# Patient Record
Sex: FEMALE | Race: WHITE | NOT HISPANIC OR LATINO | Employment: FULL TIME | URBAN - NONMETROPOLITAN AREA
[De-identification: names, ages, dates, MRNs, and addresses within clinical notes are randomized per-mention and may not be internally consistent; named-entity substitution may affect disease eponyms.]

---

## 2023-02-03 ENCOUNTER — OFFICE VISIT (OUTPATIENT)
Dept: INTERNAL MEDICINE | Facility: CLINIC | Age: 48
End: 2023-02-03
Payer: COMMERCIAL

## 2023-02-03 VITALS
HEIGHT: 66 IN | OXYGEN SATURATION: 98 % | HEART RATE: 88 BPM | SYSTOLIC BLOOD PRESSURE: 120 MMHG | DIASTOLIC BLOOD PRESSURE: 72 MMHG | WEIGHT: 196.8 LBS | TEMPERATURE: 97 F | BODY MASS INDEX: 31.63 KG/M2

## 2023-02-03 DIAGNOSIS — E28.2 PCOS (POLYCYSTIC OVARIAN SYNDROME): ICD-10-CM

## 2023-02-03 DIAGNOSIS — Z12.12 ENCOUNTER FOR COLORECTAL CANCER SCREENING: ICD-10-CM

## 2023-02-03 DIAGNOSIS — Z00.00 WELL ADULT EXAM: Primary | ICD-10-CM

## 2023-02-03 DIAGNOSIS — L20.9 ATOPIC DERMATITIS, UNSPECIFIED TYPE: ICD-10-CM

## 2023-02-03 DIAGNOSIS — Z12.11 ENCOUNTER FOR COLORECTAL CANCER SCREENING: ICD-10-CM

## 2023-02-03 PROCEDURE — 99386 PREV VISIT NEW AGE 40-64: CPT | Performed by: FAMILY MEDICINE

## 2023-02-03 RX ORDER — METFORMIN HYDROCHLORIDE 500 MG/1
1000 TABLET, EXTENDED RELEASE ORAL
Qty: 180 TABLET | Refills: 3 | Status: SHIPPED | OUTPATIENT
Start: 2023-02-03

## 2023-02-03 RX ORDER — SPIRONOLACTONE 100 MG/1
100 TABLET, FILM COATED ORAL 2 TIMES DAILY
COMMUNITY
End: 2023-02-03 | Stop reason: SDUPTHER

## 2023-02-03 RX ORDER — SPIRONOLACTONE 100 MG/1
100 TABLET, FILM COATED ORAL 2 TIMES DAILY
Qty: 180 TABLET | Refills: 3 | Status: SHIPPED | OUTPATIENT
Start: 2023-02-03

## 2023-02-03 NOTE — PROGRESS NOTES
Charlotte Romero is a 47 y.o. female.    Chief Complaint   Patient presents with   • Annual Exam   • Establish Care       HPI   Patient presents today to establish care and for annual physical exam.    Patient denies any usage of tobacco, alcohol, and illicit drugs. She had a pap smear with HPV testing on 01/232020. Her last flu vaccine was in 11/2022 and she has receive 2 COVID vaccines plus a booster. She is up to date on her tetanus vaccine. Patient reports she has not had any colorectal cancer screening and denies and family history of colon cancer. Patient states she is eating a healthy diet and exercises a couple of times a week, depending on the weather. She mentions she no longer does strenuous exercise. She is up to date on her eye and dental exams. Patient had labs obtained less than year ago. She denies any concern for anxiety or depression.    She is currently taking 2 tablets of Spironolactone 100 mg daily for her polycystic ovary syndrome. She states she was previously on metformin 1500 mg for this but stopped taking it due to the possible side effects.    She reports she has experienced eczema on her ears that began around the time of COVID-19. She attributes this to wearing the facial masks. She states she has tried using 2.5% hydrocortisone cream alone but it did not help. Patient reports applying Eucerin did help her symptoms, and she adds the hydrocortisone cream to help the itching.    Patient reports her biggest issue is her weight. She states while she was taking metformin with spironolactone and eating a low carb diet she dropped the weight. Since turning 40, she reports she has gained weight even while eating a healthy diet on weight watchers.    The following portions of the patient's history were reviewed and updated as appropriate: allergies, current medications, past family history, past medical history, past social history, past surgical history and problem list.     Past Medical  History:   Diagnosis Date   • PCOS (polycystic ovarian syndrome) 2009    From pt       Past Surgical History:   Procedure Laterality Date   • CARPAL TUNNEL RELEASE Bilateral    • UTERINE FIBROID SURGERY N/A 2018    patient proved info   • WISDOM TOOTH EXTRACTION         Family History   Problem Relation Age of Onset   • Heart disease Mother    • Diabetes Mother    • Heart attack Mother    • Coronary artery disease Mother    • Hyperlipidemia Mother    • Hypertension Mother    • Heart disease Father    • Diabetes Father    • Atrial fibrillation Father    • Fibroids Sister    • Fibroids Sister    • Stroke Paternal Grandmother    • Heart attack Paternal Grandfather        Social History     Socioeconomic History   • Marital status:    Tobacco Use   • Smoking status: Never     Passive exposure: Never   • Smokeless tobacco: Never   Vaping Use   • Vaping Use: Never used   Substance and Sexual Activity   • Alcohol use: Never   • Drug use: Never   • Sexual activity: Yes     Partners: Male       No Known Allergies      Current Outpatient Medications:   •  spironolactone (ALDACTONE) 100 MG tablet, Take 1 tablet by mouth 2 (Two) Times a Day., Disp: 180 tablet, Rfl: 3  •  Crisaborole 2 % ointment, Apply topically to the affected area 2 (Two) Times a Day., Disp: 100 g, Rfl: 1    ROS    Review of Systems   Constitutional: Negative for chills, fatigue and fever.   HENT: Negative for congestion, postnasal drip and sore throat.    Eyes: Negative for blurred vision and visual disturbance.   Respiratory: Negative for cough, shortness of breath and wheezing.    Cardiovascular: Negative for chest pain and leg swelling.   Gastrointestinal: Negative for abdominal pain, constipation, diarrhea, nausea and vomiting.   Endocrine: Positive for cold intolerance. Negative for heat intolerance.   Genitourinary: Negative for dysuria and frequency.   Musculoskeletal: Negative for arthralgias and back pain.   Skin: Positive for rash. Negative  "for color change.        Eczema on ears   Neurological: Negative for weakness, numbness and headache.   Hematological: Does not bruise/bleed easily.   Psychiatric/Behavioral: Negative for depressed mood. The patient is not nervous/anxious.        Vitals:    02/03/23 0944   BP: 120/72   Pulse: 88   Temp: 97 °F (36.1 °C)   SpO2: 98%   Weight: 89.3 kg (196 lb 12.8 oz)   Height: 167.6 cm (66\")   PainSc: 0-No pain     Body mass index is 31.76 kg/m².    Physical Exam     Physical Exam  Constitutional:       General: She is not in acute distress.     Appearance: Normal appearance. She is well-developed.   HENT:      Head: Normocephalic and atraumatic.      Right Ear: Tympanic membrane and external ear normal.      Left Ear: Tympanic membrane and external ear normal.      Ears:      Comments: Blue tinge behind cerumen in right ear canal (no longer present once ear was irrigated)  Eyes:      Conjunctiva/sclera: Conjunctivae normal.      Pupils: Pupils are equal, round, and reactive to light.   Cardiovascular:      Rate and Rhythm: Normal rate and regular rhythm.      Heart sounds: No murmur heard.  Pulmonary:      Effort: Pulmonary effort is normal. No respiratory distress.      Breath sounds: Normal breath sounds. No wheezing.   Abdominal:      General: Bowel sounds are normal. There is no distension.      Palpations: Abdomen is soft.      Tenderness: There is no abdominal tenderness.   Musculoskeletal:         General: Normal range of motion.      Cervical back: Normal range of motion and neck supple.   Lymphadenopathy:      Cervical: No cervical adenopathy.   Skin:     General: Skin is warm and dry.   Neurological:      Mental Status: She is alert and oriented to person, place, and time.      Cranial Nerves: No cranial nerve deficit.      Deep Tendon Reflexes: Reflexes normal.   Psychiatric:         Mood and Affect: Mood normal.         Behavior: Behavior normal.         Assessment/Plan    Diagnoses and all orders for " this visit:    1. Well adult exam (Primary)  Assessment & Plan:  Unremarkable physical exam findings. Discussed with patient routine health maintenance including vaccines, dental/eye health, healthy diet and exercise, mammograms, Pap smears, colorectal cancer screening.  Mental health has been addressed today as well. Reviewed labs that were ordered by another facility several months ago but within the last year. Cologuard has been ordered today.      2. PCOS (polycystic ovarian syndrome)  Assessment & Plan:  Patient will continue spironolactone. We'll start back on metformin due to weight gain since being off the medication. Also encouraged regular exercise and dietary changes.      3. Encounter for colorectal cancer screening  -     Cologuard - Stool, Per Rectum; Future    4. Atopic dermatitis, unspecified type  Assessment & Plan:  Uncontrolled with steroids and eucerin cream.  Will send in prescription for eucrisa.  Encouraged to obtain savings card from their website.       Other orders  -     Crisaborole 2 % ointment; Apply topically to the affected area 2 (Two) Times a Day.  Dispense: 100 g; Refill: 1  -     spironolactone (ALDACTONE) 100 MG tablet; Take 1 tablet by mouth 2 (Two) Times a Day.  Dispense: 180 tablet; Refill: 3        New Medications Ordered This Visit   Medications   • Crisaborole 2 % ointment     Sig: Apply topically to the affected area 2 (Two) Times a Day.     Dispense:  100 g     Refill:  1   • spironolactone (ALDACTONE) 100 MG tablet     Sig: Take 1 tablet by mouth 2 (Two) Times a Day.     Dispense:  180 tablet     Refill:  3         No orders of the defined types were placed in this encounter.      Return in about 1 year (around 2/3/2024) for Annual.      Transcribed from ambient dictation for Aviva Stout DO by Garima Desai.  02/03/23   13:53 EST    Patient or patient representative verbalized consent to the visit recording.  I have personally performed the services described in  this document as transcribed by the above individual, and it is both accurate and complete.  Aviva Stout DO  2/3/2023  18:52 EST

## 2023-02-03 NOTE — ASSESSMENT & PLAN NOTE
Unremarkable physical exam findings. Discussed with patient routine health maintenance including vaccines, dental/eye health, healthy diet and exercise, mammograms, Pap smears, colorectal cancer screening.  Mental health has been addressed today as well. Reviewed labs that were ordered by another facility several months ago but within the last year. Cologuard has been ordered today.

## 2023-02-03 NOTE — ASSESSMENT & PLAN NOTE
Patient will continue spironolactone. We'll start back on metformin due to weight gain since being off the medication. Also encouraged regular exercise and dietary changes.

## 2023-02-03 NOTE — ASSESSMENT & PLAN NOTE
Uncontrolled with steroids and eucerin cream.  Will send in prescription for eucrisa.  Encouraged to obtain savings card from their website.

## 2023-04-27 RX ORDER — SPIRONOLACTONE 100 MG/1
100 TABLET, FILM COATED ORAL 2 TIMES DAILY
Qty: 60 TABLET | Refills: 0 | Status: SHIPPED | OUTPATIENT
Start: 2023-04-27

## 2023-06-19 ENCOUNTER — OFFICE VISIT (OUTPATIENT)
Dept: INTERNAL MEDICINE | Facility: CLINIC | Age: 48
End: 2023-06-19
Payer: COMMERCIAL

## 2023-06-19 VITALS
BODY MASS INDEX: 32.47 KG/M2 | TEMPERATURE: 98 F | DIASTOLIC BLOOD PRESSURE: 80 MMHG | HEIGHT: 66 IN | SYSTOLIC BLOOD PRESSURE: 120 MMHG | WEIGHT: 202 LBS | OXYGEN SATURATION: 97 % | HEART RATE: 74 BPM

## 2023-06-19 DIAGNOSIS — E66.9 CLASS 1 OBESITY WITHOUT SERIOUS COMORBIDITY WITH BODY MASS INDEX (BMI) OF 32.0 TO 32.9 IN ADULT, UNSPECIFIED OBESITY TYPE: Primary | ICD-10-CM

## 2023-06-19 PROBLEM — E66.811 CLASS 1 OBESITY WITHOUT SERIOUS COMORBIDITY WITH BODY MASS INDEX (BMI) OF 32.0 TO 32.9 IN ADULT: Status: ACTIVE | Noted: 2023-06-19

## 2023-06-19 PROCEDURE — 99213 OFFICE O/P EST LOW 20 MIN: CPT | Performed by: FAMILY MEDICINE

## 2023-06-19 RX ORDER — SEMAGLUTIDE 0.25 MG/.5ML
0.25 INJECTION, SOLUTION SUBCUTANEOUS WEEKLY
Qty: 2 ML | Refills: 1 | Status: SHIPPED | OUTPATIENT
Start: 2023-06-19

## 2023-06-19 NOTE — ASSESSMENT & PLAN NOTE
Encouraged to continue a healthy diet and routine exercise. She has not lost any weight on metformin here recently. She has not tried any other oral medications at this time, but she is interested in trying Wegovy. Advised that this medication will need a prior authorization. If denied, we will go from there. However if covered, patient is advised to take a savings card to the pharmacy for better pricing. Discussed how medication should be taken and when to increase the dosage.

## 2023-06-19 NOTE — PROGRESS NOTES
Charlotte Romero is a 47 y.o. female.    Chief Complaint   Patient presents with    Weight Loss     Has been on Noom since march with no success.        HPI     Charlotte Romero is a 47-year-old female who presents today for weight loss.    The patient reports that although she is counting calories, she does not have any significant changes in her weight. She often fluctuates about 1 to 4 pounds and stays around 198 pounds. She states that she has had more improvement with taking metformin in the past. She denies previously taking Wellbutrin, Topamax, Contrave, Adipex, or any other oral medications for weight loss. She notes concern with taking antidepressants.    The patient has a history of PCOS, which was the primary reason for taking metformin. She states that her menstrual cycle regularly occurs every 3 weeks. She denies having any heavy menses.     The following portions of the patient's history were reviewed and updated as appropriate: allergies, current medications, past family history, past medical history, past social history, past surgical history and problem list.     No Known Allergies      Current Outpatient Medications:     Crisaborole 2 % ointment, Apply topically to the affected area 2 (Two) Times a Day., Disp: 100 g, Rfl: 1    metFORMIN ER (GLUCOPHAGE-XR) 500 MG 24 hr tablet, Take 2 tablets by mouth Daily With Breakfast., Disp: 180 tablet, Rfl: 3    spironolactone (ALDACTONE) 100 MG tablet, Take 1 tablet by mouth 2 (Two) Times a Day., Disp: 60 tablet, Rfl: 0    Semaglutide-Weight Management (Wegovy) 0.25 MG/0.5ML solution auto-injector, Inject 0.25 mg under the skin into the appropriate area as directed 1 (One) Time Per Week., Disp: 2 mL, Rfl: 1    ROS    Review of Systems   Respiratory:  Negative for cough, shortness of breath and wheezing.    Cardiovascular:  Negative for chest pain and leg swelling.   Gastrointestinal:  Negative for abdominal pain, constipation, diarrhea, nausea and vomiting.  "    Vitals:    06/19/23 1625   BP: 120/80   BP Location: Left arm   Patient Position: Sitting   Cuff Size: Adult   Pulse: 74   Temp: 98 °F (36.7 °C)   SpO2: 97%   Weight: 91.6 kg (202 lb)   Height: 167.6 cm (66\")   PainSc: 0-No pain     Body mass index is 32.6 kg/m².    Physical Exam     Physical Exam  Constitutional:       General: She is not in acute distress.     Appearance: She is well-developed. She is obese.   HENT:      Head: Normocephalic and atraumatic.      Right Ear: External ear normal.      Left Ear: External ear normal.   Eyes:      Extraocular Movements: Extraocular movements intact.      Conjunctiva/sclera: Conjunctivae normal.   Cardiovascular:      Rate and Rhythm: Normal rate and regular rhythm.      Heart sounds: No murmur heard.  Pulmonary:      Effort: Pulmonary effort is normal. No respiratory distress.      Breath sounds: Normal breath sounds. No wheezing.   Abdominal:      General: Bowel sounds are normal. There is no distension.      Palpations: Abdomen is soft.      Tenderness: There is no abdominal tenderness.   Musculoskeletal:      Right lower leg: No edema.      Left lower leg: No edema.   Skin:     General: Skin is warm and dry.   Neurological:      Mental Status: She is alert and oriented to person, place, and time.      Cranial Nerves: No cranial nerve deficit.   Psychiatric:         Mood and Affect: Mood normal.         Behavior: Behavior normal.       Assessment/Plan    Diagnoses and all orders for this visit:    1. Class 1 obesity without serious comorbidity with body mass index (BMI) of 32.0 to 32.9 in adult, unspecified obesity type (Primary)  Assessment & Plan:  Encouraged to continue a healthy diet and routine exercise. She has not lost any weight on metformin here recently. She has not tried any other oral medications at this time, but she is interested in trying Wegovy. Advised that this medication will need a prior authorization. If denied, we will go from there. However " if covered, patient is advised to take a savings card to the pharmacy for better pricing. Discussed how medication should be taken and when to increase the dosage.      Other orders  -     Semaglutide-Weight Management (Wegovy) 0.25 MG/0.5ML solution auto-injector; Inject 0.25 mg under the skin into the appropriate area as directed 1 (One) Time Per Week.  Dispense: 2 mL; Refill: 1        New Medications Ordered This Visit   Medications    Semaglutide-Weight Management (Wegovy) 0.25 MG/0.5ML solution auto-injector     Sig: Inject 0.25 mg under the skin into the appropriate area as directed 1 (One) Time Per Week.     Dispense:  2 mL     Refill:  1       No orders of the defined types were placed in this encounter.      Return in about 3 months (around 9/19/2023) for weight check.    Aviva Stout DO        Transcribed from ambient dictation for Aviva Stout DO by Brynn Joiner.  06/19/23   17:33 EDT    Patient or patient representative verbalized consent to the visit recording.  I have personally performed the services described in this document as transcribed by the above individual, and it is both accurate and complete.  Aviva Stout DO  6/19/2023  20:43 EDT

## 2023-10-06 ENCOUNTER — PATIENT MESSAGE (OUTPATIENT)
Dept: INTERNAL MEDICINE | Facility: CLINIC | Age: 48
End: 2023-10-06
Payer: COMMERCIAL

## 2023-10-06 NOTE — TELEPHONE ENCOUNTER
From: Charlotte Romero  To: Aviva Stout  Sent: 10/6/2023 8:18 AM EDT  Subject: Wegovy preapproval    Hello, I received a letter from my insurance that stated the preapproval is set to  and my provider needs to update it. I have not made a follow up apt because I have only had one month of Wegovy since it was prescribed. It has been difficult to get. I have had success during that month and would like to continue to use. From my understanding I would need to be seen after 3 months of use to assess the need to increase the dose. I have not gotten that far with it. Can I get the preapproval renewed so that I can continue with the medication?  Thanks,  Charlotte

## 2023-10-17 RX ORDER — BUPROPION HYDROCHLORIDE 150 MG/1
150 TABLET ORAL DAILY
Qty: 90 TABLET | Refills: 1 | Status: SHIPPED | OUTPATIENT
Start: 2023-10-17

## 2024-01-02 RX ORDER — BUPROPION HYDROCHLORIDE 150 MG/1
150 TABLET ORAL DAILY
Qty: 90 TABLET | Refills: 1 | Status: SHIPPED | OUTPATIENT
Start: 2024-01-02

## 2024-01-02 RX ORDER — BUPROPION HYDROCHLORIDE 150 MG/1
150 TABLET ORAL DAILY
Qty: 90 TABLET | Refills: 1 | OUTPATIENT
Start: 2024-01-02

## 2024-03-05 RX ORDER — METFORMIN HYDROCHLORIDE 500 MG/1
1000 TABLET, EXTENDED RELEASE ORAL
Qty: 180 TABLET | Refills: 3 | Status: SHIPPED | OUTPATIENT
Start: 2024-03-05

## 2024-04-15 ENCOUNTER — OFFICE VISIT (OUTPATIENT)
Dept: INTERNAL MEDICINE | Facility: CLINIC | Age: 49
End: 2024-04-15
Payer: COMMERCIAL

## 2024-04-15 VITALS
TEMPERATURE: 98 F | DIASTOLIC BLOOD PRESSURE: 78 MMHG | HEIGHT: 66 IN | HEART RATE: 84 BPM | OXYGEN SATURATION: 100 % | WEIGHT: 200 LBS | BODY MASS INDEX: 32.14 KG/M2 | SYSTOLIC BLOOD PRESSURE: 118 MMHG

## 2024-04-15 DIAGNOSIS — Z00.00 WELL ADULT EXAM: Primary | ICD-10-CM

## 2024-04-15 DIAGNOSIS — Z13.29 SCREENING FOR ENDOCRINE, METABOLIC AND IMMUNITY DISORDER: ICD-10-CM

## 2024-04-15 DIAGNOSIS — Z13.228 SCREENING FOR ENDOCRINE, METABOLIC AND IMMUNITY DISORDER: ICD-10-CM

## 2024-04-15 DIAGNOSIS — Z13.0 SCREENING FOR ENDOCRINE, METABOLIC AND IMMUNITY DISORDER: ICD-10-CM

## 2024-04-15 DIAGNOSIS — L20.9 ATOPIC DERMATITIS, UNSPECIFIED TYPE: ICD-10-CM

## 2024-04-15 DIAGNOSIS — E28.2 PCOS (POLYCYSTIC OVARIAN SYNDROME): ICD-10-CM

## 2024-04-15 DIAGNOSIS — E66.9 CLASS 1 OBESITY WITHOUT SERIOUS COMORBIDITY WITH BODY MASS INDEX (BMI) OF 32.0 TO 32.9 IN ADULT, UNSPECIFIED OBESITY TYPE: ICD-10-CM

## 2024-04-15 DIAGNOSIS — Z13.220 SCREENING, LIPID: ICD-10-CM

## 2024-04-15 DIAGNOSIS — F41.9 ANXIETY: ICD-10-CM

## 2024-04-15 PROCEDURE — 99396 PREV VISIT EST AGE 40-64: CPT | Performed by: FAMILY MEDICINE

## 2024-04-15 RX ORDER — SPIRONOLACTONE 100 MG/1
100 TABLET, FILM COATED ORAL 2 TIMES DAILY
Qty: 180 TABLET | Refills: 3 | Status: SHIPPED | OUTPATIENT
Start: 2024-04-15

## 2024-04-15 RX ORDER — PROPRANOLOL HYDROCHLORIDE 10 MG/1
10 TABLET ORAL 3 TIMES DAILY PRN
Qty: 90 TABLET | Refills: 1 | Status: SHIPPED | OUTPATIENT
Start: 2024-04-15

## 2024-04-15 RX ORDER — BUPROPION HYDROCHLORIDE 150 MG/1
150 TABLET ORAL DAILY
Qty: 90 TABLET | Refills: 3 | Status: SHIPPED | OUTPATIENT
Start: 2024-04-15

## 2024-04-15 RX ORDER — METFORMIN HYDROCHLORIDE 500 MG/1
1000 TABLET, EXTENDED RELEASE ORAL
Qty: 180 TABLET | Refills: 3 | Status: SHIPPED | OUTPATIENT
Start: 2024-04-15

## 2024-04-15 NOTE — ASSESSMENT & PLAN NOTE
The patient will maintain her current regimen of metformin and Wellbutrin. She acknowledges the necessity for lifestyle modifications to aid in weight loss.

## 2024-04-15 NOTE — ASSESSMENT & PLAN NOTE
A comprehensive discussion was held with the patient regarding routine health maintenance, inclusive of vaccines, dental/eye health, a healthy diet, regular exercise, colorectal cancer screening, breast cancer screening, and Pap smears. The patient's mental health has been addressed today. Routine laboratory tests have been ordered.

## 2024-04-15 NOTE — ASSESSMENT & PLAN NOTE
Her anxiety has shown improvement with Wellbutrin. She will continue with her current medication regimen. She may take propranolol as needed for immediate relief of test anxiety.

## 2024-04-15 NOTE — PROGRESS NOTES
Charlotte Romero is a 48 y.o. female.    Chief Complaint   Patient presents with    Annual Exam       HPI     Charlotte Romero is a 48-year-old female who presents today for an annual physical exam.    Patient acknowledges a lack of adherence to a healthy diet.  Wellbutrin has not helped with weight loss, but has helped with anxiety with recent life stressor. Her weight has remained consistent. Her busy schedule has hindered her ability to exercise regularly. Her last Pap smear was conducted in 2020, and she underwent a Cologuard test in 2023. Her last mammogram was conducted in 2021. She is current with her dental and eye exams. Her menstrual cycles are irregular, and she gained a few pounds prior to her menstrual cycle.     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     Patient has eczema in her ears and notes the medicine works some. She inquired if it is okay to take Claritin every day if there are any long-term effects. This happened when COVID-19 started and she got her dog. The Claritin helped some, but it does not go away.    Patient continues her regimen of Wellbutrin, metformin, and spironolactone, taking two tablets daily, and is seeking refills for these medications. She is considering starting propranolol for test anxiety, but is concerned about potential interactions with spironolactone.     Patient confirms that her immunizations for influenza and COVID-19 are up to date, with her last tetanus vaccination administered in 2021 at her workplace.     The following portions of the patient's history were reviewed and updated as appropriate: allergies, current medications, past family history, past medical history, past social history, past surgical history and problem list.     Past Medical History:   Diagnosis Date    PCOS (polycystic ovarian syndrome) 2009    From pt       Past Surgical History:   Procedure Laterality Date     CARPAL TUNNEL RELEASE Bilateral     UTERINE FIBROID SURGERY N/A 2018    patient proved info    WISDOM TOOTH EXTRACTION         Family History   Problem Relation Age of Onset    Heart disease Mother     Diabetes Mother     Heart attack Mother     Coronary artery disease Mother     Hyperlipidemia Mother     Hypertension Mother     Heart disease Father     Diabetes Father     Atrial fibrillation Father     Fibroids Sister     Fibroids Sister     Stroke Paternal Grandmother     Heart attack Paternal Grandfather        Social History     Socioeconomic History    Marital status:    Tobacco Use    Smoking status: Never     Passive exposure: Never    Smokeless tobacco: Never   Vaping Use    Vaping status: Never Used   Substance and Sexual Activity    Alcohol use: Never    Drug use: Never    Sexual activity: Yes     Partners: Male       No Known Allergies      Current Outpatient Medications:     buPROPion XL (WELLBUTRIN XL) 150 MG 24 hr tablet, Take 1 tablet by mouth Daily., Disp: 90 tablet, Rfl: 3    Crisaborole 2 % ointment, Apply topically to the affected area 2 (Two) Times a Day., Disp: 100 g, Rfl: 1    metFORMIN ER (GLUCOPHAGE-XR) 500 MG 24 hr tablet, Take 2 tablets by mouth Daily With Breakfast., Disp: 180 tablet, Rfl: 3    spironolactone (ALDACTONE) 100 MG tablet, Take 1 tablet by mouth 2 (Two) Times a Day., Disp: 180 tablet, Rfl: 3    propranolol (INDERAL) 10 MG tablet, Take 1 tablet by mouth 3 (Three) Times a Day As Needed (situtational anxiety)., Disp: 90 tablet, Rfl: 1    ROS    Review of Systems   Constitutional:  Negative for chills, fatigue and fever.   HENT:  Negative for congestion and rhinorrhea.    Eyes:  Negative for blurred vision, itching and visual disturbance.   Respiratory:  Negative for cough and shortness of breath.    Cardiovascular:  Negative for chest pain and leg swelling.   Gastrointestinal:  Negative for abdominal pain, constipation, diarrhea, nausea and vomiting.   Musculoskeletal:   "Negative for arthralgias and back pain.   Skin:  Negative for color change and rash.   Allergic/Immunologic: Positive for environmental allergies.   Neurological:  Negative for weakness, numbness and headache.   Hematological:  Does not bruise/bleed easily.   Psychiatric/Behavioral:  Negative for depressed mood. The patient is not nervous/anxious.        Vitals:    04/15/24 0818   BP: 118/78   BP Location: Right arm   Patient Position: Sitting   Cuff Size: Adult   Pulse: 84   Temp: 98 °F (36.7 °C)   SpO2: 100%   Weight: 90.7 kg (200 lb)   Height: 167.6 cm (66\")   PainSc: 0-No pain     Body mass index is 32.28 kg/m².    Physical Exam     Physical Exam  Constitutional:       General: She is not in acute distress.     Appearance: Normal appearance. She is well-developed.   HENT:      Head: Normocephalic and atraumatic.      Right Ear: Tympanic membrane and external ear normal.      Left Ear: Tympanic membrane and external ear normal.      Mouth/Throat:      Pharynx: No posterior oropharyngeal erythema.   Eyes:      Extraocular Movements: Extraocular movements intact.      Conjunctiva/sclera: Conjunctivae normal.      Pupils: Pupils are equal, round, and reactive to light.   Cardiovascular:      Rate and Rhythm: Normal rate and regular rhythm.      Heart sounds: No murmur heard.  Pulmonary:      Effort: Pulmonary effort is normal. No respiratory distress.      Breath sounds: Normal breath sounds. No wheezing.   Abdominal:      General: Bowel sounds are normal. There is no distension.      Palpations: Abdomen is soft.      Tenderness: There is no abdominal tenderness.   Musculoskeletal:      Cervical back: Neck supple.      Right lower leg: No edema.      Left lower leg: No edema.   Lymphadenopathy:      Cervical: No cervical adenopathy.   Skin:     General: Skin is warm and dry.   Neurological:      Mental Status: She is alert and oriented to person, place, and time.      Cranial Nerves: No cranial nerve deficit.      " Deep Tendon Reflexes: Reflexes normal.   Psychiatric:         Mood and Affect: Mood normal.         Behavior: Behavior normal.         Assessment/Plan    Diagnoses and all orders for this visit:    1. Well adult exam (Primary)  Assessment & Plan:  A comprehensive discussion was held with the patient regarding routine health maintenance, inclusive of vaccines, dental/eye health, a healthy diet, regular exercise, colorectal cancer screening, breast cancer screening, and Pap smears. The patient's mental health has been addressed today. Routine laboratory tests have been ordered.      2. PCOS (polycystic ovarian syndrome)  Assessment & Plan:  The patient's condition is stable with her current medication regimen. She will maintain her current regimen of metformin and spironolactone.        3. Class 1 obesity without serious comorbidity with body mass index (BMI) of 32.0 to 32.9 in adult, unspecified obesity type  Assessment & Plan:  The patient will maintain her current regimen of metformin and Wellbutrin. She acknowledges the necessity for lifestyle modifications to aid in weight loss.        4. Screening, lipid  -     Lipid Panel    5. Screening for endocrine, metabolic and immunity disorder  -     CBC & Differential  -     Comprehensive Metabolic Panel  -     Lipid Panel  -     TSH    6. Atopic dermatitis, unspecified type  Assessment & Plan:  May continue OTC antihistamine and eucrisa prn.       7. Anxiety  Assessment & Plan:  Her anxiety has shown improvement with Wellbutrin. She will continue with her current medication regimen. She may take propranolol as needed for immediate relief of test anxiety.      Other orders  -     spironolactone (ALDACTONE) 100 MG tablet; Take 1 tablet by mouth 2 (Two) Times a Day.  Dispense: 180 tablet; Refill: 3  -     metFORMIN ER (GLUCOPHAGE-XR) 500 MG 24 hr tablet; Take 2 tablets by mouth Daily With Breakfast.  Dispense: 180 tablet; Refill: 3  -     buPROPion XL (WELLBUTRIN XL) 150  MG 24 hr tablet; Take 1 tablet by mouth Daily.  Dispense: 90 tablet; Refill: 3  -     propranolol (INDERAL) 10 MG tablet; Take 1 tablet by mouth 3 (Three) Times a Day As Needed (situtational anxiety).  Dispense: 90 tablet; Refill: 1        New Medications Ordered This Visit   Medications    spironolactone (ALDACTONE) 100 MG tablet     Sig: Take 1 tablet by mouth 2 (Two) Times a Day.     Dispense:  180 tablet     Refill:  3    metFORMIN ER (GLUCOPHAGE-XR) 500 MG 24 hr tablet     Sig: Take 2 tablets by mouth Daily With Breakfast.     Dispense:  180 tablet     Refill:  3     Please send a replace/new response with 90-Day Supply if appropriate to maximize member benefit. Requesting 1 year supply.    buPROPion XL (WELLBUTRIN XL) 150 MG 24 hr tablet     Sig: Take 1 tablet by mouth Daily.     Dispense:  90 tablet     Refill:  3     Please send a replace/new response with 90-Day Supply if appropriate to maximize member benefit. Requesting 1 year supply.    propranolol (INDERAL) 10 MG tablet     Sig: Take 1 tablet by mouth 3 (Three) Times a Day As Needed (situtational anxiety).     Dispense:  90 tablet     Refill:  1       No orders of the defined types were placed in this encounter.      Return in about 1 year (around 4/15/2025) for Annual.      Aviva Stout DO    Transcribed from ambient dictation for Aviva Stout DO by Domingo Blandon.  04/15/24   09:41 EDT    Patient or patient representative verbalized consent to the visit recording.  I have personally performed the services described in this document as transcribed by the above individual, and it is both accurate and complete.  Aviva Stout DO  4/15/2024  10:12 EDT

## 2024-04-15 NOTE — ASSESSMENT & PLAN NOTE
The patient's condition is stable with her current medication regimen. She will maintain her current regimen of metformin and spironolactone.

## 2024-04-16 LAB
ALBUMIN SERPL-MCNC: 4.4 G/DL (ref 3.5–5.2)
ALBUMIN/GLOB SERPL: 1.6 G/DL
ALP SERPL-CCNC: 49 U/L (ref 39–117)
ALT SERPL-CCNC: 13 U/L (ref 1–33)
AST SERPL-CCNC: 15 U/L (ref 1–32)
BASOPHILS # BLD AUTO: 0.04 10*3/MM3 (ref 0–0.2)
BASOPHILS NFR BLD AUTO: 0.7 % (ref 0–1.5)
BILIRUB SERPL-MCNC: 0.3 MG/DL (ref 0–1.2)
BUN SERPL-MCNC: 8 MG/DL (ref 6–20)
BUN/CREAT SERPL: 7.7 (ref 7–25)
CALCIUM SERPL-MCNC: 9.7 MG/DL (ref 8.6–10.5)
CHLORIDE SERPL-SCNC: 105 MMOL/L (ref 98–107)
CHOLEST SERPL-MCNC: 217 MG/DL (ref 0–200)
CO2 SERPL-SCNC: 25.3 MMOL/L (ref 22–29)
CREAT SERPL-MCNC: 1.04 MG/DL (ref 0.57–1)
EGFRCR SERPLBLD CKD-EPI 2021: 66.4 ML/MIN/1.73
EOSINOPHIL # BLD AUTO: 0.1 10*3/MM3 (ref 0–0.4)
EOSINOPHIL NFR BLD AUTO: 1.7 % (ref 0.3–6.2)
ERYTHROCYTE [DISTWIDTH] IN BLOOD BY AUTOMATED COUNT: 16.1 % (ref 12.3–15.4)
GLOBULIN SER CALC-MCNC: 2.8 GM/DL
GLUCOSE SERPL-MCNC: 84 MG/DL (ref 65–99)
HCT VFR BLD AUTO: 37.3 % (ref 34–46.6)
HDLC SERPL-MCNC: 55 MG/DL (ref 40–60)
HGB BLD-MCNC: 11.5 G/DL (ref 12–15.9)
IMM GRANULOCYTES # BLD AUTO: 0.03 10*3/MM3 (ref 0–0.05)
IMM GRANULOCYTES NFR BLD AUTO: 0.5 % (ref 0–0.5)
LDLC SERPL CALC-MCNC: 133 MG/DL (ref 0–100)
LYMPHOCYTES # BLD AUTO: 1.76 10*3/MM3 (ref 0.7–3.1)
LYMPHOCYTES NFR BLD AUTO: 29.2 % (ref 19.6–45.3)
MCH RBC QN AUTO: 23.7 PG (ref 26.6–33)
MCHC RBC AUTO-ENTMCNC: 30.8 G/DL (ref 31.5–35.7)
MCV RBC AUTO: 76.7 FL (ref 79–97)
MONOCYTES # BLD AUTO: 0.48 10*3/MM3 (ref 0.1–0.9)
MONOCYTES NFR BLD AUTO: 8 % (ref 5–12)
NEUTROPHILS # BLD AUTO: 3.62 10*3/MM3 (ref 1.7–7)
NEUTROPHILS NFR BLD AUTO: 59.9 % (ref 42.7–76)
NRBC BLD AUTO-RTO: 0 /100 WBC (ref 0–0.2)
PLATELET # BLD AUTO: 309 10*3/MM3 (ref 140–450)
POTASSIUM SERPL-SCNC: 5 MMOL/L (ref 3.5–5.2)
PROT SERPL-MCNC: 7.2 G/DL (ref 6–8.5)
RBC # BLD AUTO: 4.86 10*6/MM3 (ref 3.77–5.28)
SODIUM SERPL-SCNC: 141 MMOL/L (ref 136–145)
TRIGL SERPL-MCNC: 163 MG/DL (ref 0–150)
TSH SERPL DL<=0.005 MIU/L-ACNC: 2 UIU/ML (ref 0.27–4.2)
VLDLC SERPL CALC-MCNC: 29 MG/DL (ref 5–40)
WBC # BLD AUTO: 6.03 10*3/MM3 (ref 3.4–10.8)

## 2024-04-24 DIAGNOSIS — N17.9 AKI (ACUTE KIDNEY INJURY): Primary | ICD-10-CM

## 2024-05-29 RX ORDER — PROPRANOLOL HYDROCHLORIDE 10 MG/1
TABLET ORAL
Qty: 180 TABLET | Refills: 5 | Status: SHIPPED | OUTPATIENT
Start: 2024-05-29

## 2024-08-09 PROBLEM — J02.9 SORE THROAT: Status: ACTIVE | Noted: 2024-08-09

## 2025-05-05 RX ORDER — BUPROPION HYDROCHLORIDE 150 MG/1
150 TABLET ORAL DAILY
Qty: 90 TABLET | Refills: 3 | Status: SHIPPED | OUTPATIENT
Start: 2025-05-05

## 2025-05-28 ENCOUNTER — OFFICE VISIT (OUTPATIENT)
Dept: INTERNAL MEDICINE | Facility: CLINIC | Age: 50
End: 2025-05-28
Payer: COMMERCIAL

## 2025-05-28 VITALS
DIASTOLIC BLOOD PRESSURE: 76 MMHG | OXYGEN SATURATION: 99 % | BODY MASS INDEX: 26.36 KG/M2 | WEIGHT: 164 LBS | TEMPERATURE: 98 F | HEART RATE: 77 BPM | HEIGHT: 66 IN | SYSTOLIC BLOOD PRESSURE: 118 MMHG

## 2025-05-28 DIAGNOSIS — Z13.220 SCREENING, LIPID: ICD-10-CM

## 2025-05-28 DIAGNOSIS — Z00.00 WELL ADULT EXAM: Primary | ICD-10-CM

## 2025-05-28 DIAGNOSIS — E28.2 PCOS (POLYCYSTIC OVARIAN SYNDROME): ICD-10-CM

## 2025-05-28 DIAGNOSIS — Z13.0 SCREENING FOR ENDOCRINE, METABOLIC AND IMMUNITY DISORDER: ICD-10-CM

## 2025-05-28 DIAGNOSIS — Z13.29 SCREENING FOR ENDOCRINE, METABOLIC AND IMMUNITY DISORDER: ICD-10-CM

## 2025-05-28 DIAGNOSIS — Z13.0 SCREENING FOR BLOOD DISEASE: ICD-10-CM

## 2025-05-28 DIAGNOSIS — Z12.31 SCREENING MAMMOGRAM FOR BREAST CANCER: ICD-10-CM

## 2025-05-28 DIAGNOSIS — E66.3 OVERWEIGHT: ICD-10-CM

## 2025-05-28 DIAGNOSIS — F41.9 ANXIETY: ICD-10-CM

## 2025-05-28 DIAGNOSIS — Z13.228 SCREENING FOR ENDOCRINE, METABOLIC AND IMMUNITY DISORDER: ICD-10-CM

## 2025-05-28 PROBLEM — E66.811 CLASS 1 OBESITY WITHOUT SERIOUS COMORBIDITY WITH BODY MASS INDEX (BMI) OF 32.0 TO 32.9 IN ADULT: Status: RESOLVED | Noted: 2023-06-19 | Resolved: 2025-05-28

## 2025-05-28 RX ORDER — SPIRONOLACTONE 100 MG/1
100 TABLET, FILM COATED ORAL 2 TIMES DAILY
Qty: 180 TABLET | Refills: 3 | Status: SHIPPED | OUTPATIENT
Start: 2025-05-28

## 2025-05-28 NOTE — PROGRESS NOTES
Charlotte Romero is a 49 y.o. female.    Chief Complaint   Patient presents with    Annual Exam       HPI   History of Present Illness  The patient presents for an annual physical exam and anxiety follow-up.    She attributes weight loss to semaglutide but cannot continue it. Interested in Contrave but unsure of efficacy with Wellbutrin, which aids focus and well-being. Dietary habits are poor; semaglutide reduced appetite. Lost 26 pounds since last visit. Remaining semaglutide supply lasts 1-2 months; considering weight maintenance without it.     Wellbutrin manages anxiety but occasionally insufficient. Propranolol helps with test-related anxiety. Admits to occasional depression. Significant life changes over the past year; uncertain about needs. Sufficient propranolol supply.    Continues spironolactone for PCOS. Tried birth control and IUDs; spironolactone most effective.    No chest pain, shortness of breath, nausea, vomiting, diarrhea, constipation, cough, congestion, runny nose, urinary issues, fatigue, body aches, pains, rashes, bruises, frequent headaches, or sleep disturbances.    Up-to-date on dental and eye exams. Received influenza vaccine at work in 10/2024 and tetanus vaccine earlier this month. Cologuard test in 2023; due next year. Last Pap smear in 2020; has not seen a gynecologist since moving. Last mammogram a few years ago.        The following portions of the patient's history were reviewed and updated as appropriate: allergies, current medications, past family history, past medical history, past social history, past surgical history and problem list.     Past Medical History:   Diagnosis Date    PCOS (polycystic ovarian syndrome) 2009    From pt       Past Surgical History:   Procedure Laterality Date    CARPAL TUNNEL RELEASE Bilateral     UTERINE FIBROID SURGERY N/A 2018    patient proved info    WISDOM TOOTH EXTRACTION         Family History   Problem Relation Age of Onset    Heart disease  Mother     Diabetes Mother     Heart attack Mother     Coronary artery disease Mother     Hyperlipidemia Mother     Hypertension Mother     Heart disease Father     Diabetes Father     Atrial fibrillation Father     Fibroids Sister     Fibroids Sister     Stroke Paternal Grandmother     Heart attack Paternal Grandfather        Social History     Socioeconomic History    Marital status:    Tobacco Use    Smoking status: Never     Passive exposure: Never    Smokeless tobacco: Never   Vaping Use    Vaping status: Never Used   Substance and Sexual Activity    Alcohol use: Never    Drug use: Never    Sexual activity: Yes     Partners: Male       No Known Allergies      Current Outpatient Medications:     buPROPion XL (WELLBUTRIN XL) 150 MG 24 hr tablet, TAKE 1 TABLET BY MOUTH DAILY, Disp: 90 tablet, Rfl: 3    propranolol (INDERAL) 10 MG tablet, TAKE 1 TABLET BY MOUTH 3 TIMES  DAILY AS NEEDED FOR SITUTATIONAL ANXIETY, Disp: 180 tablet, Rfl: 5    spironolactone (ALDACTONE) 100 MG tablet, Take 1 tablet by mouth 2 (Two) Times a Day., Disp: 180 tablet, Rfl: 3    ROS    Review of Systems   Constitutional:  Negative for chills, fatigue and fever.   HENT:  Negative for congestion, postnasal drip and sore throat.    Eyes:  Negative for blurred vision and visual disturbance.   Respiratory:  Negative for cough, shortness of breath and wheezing.    Cardiovascular:  Negative for chest pain and leg swelling.   Gastrointestinal:  Negative for abdominal pain, constipation, diarrhea, nausea and vomiting.   Endocrine: Negative for cold intolerance and heat intolerance.   Genitourinary:  Negative for dysuria and frequency.   Musculoskeletal:  Negative for arthralgias.   Skin:  Negative for rash.   Allergic/Immunologic: Negative for environmental allergies.   Neurological:  Negative for weakness, numbness and headache.   Hematological:  Does not bruise/bleed easily.   Psychiatric/Behavioral:  Positive for depressed mood (occasional).  "Negative for sleep disturbance. The patient is nervous/anxious.        Vitals:    05/28/25 1006   BP: 118/76   Pulse: 77   Temp: 98 °F (36.7 °C)   SpO2: 99%   Weight: 74.4 kg (164 lb)   Height: 167.6 cm (65.98\")   PainSc: 0-No pain     Body mass index is 26.49 kg/m².    Physical Exam     Physical Exam  Constitutional:       General: She is not in acute distress.     Appearance: Normal appearance. She is well-developed.   HENT:      Head: Normocephalic and atraumatic.      Right Ear: Tympanic membrane and external ear normal.      Left Ear: Tympanic membrane and external ear normal.      Mouth/Throat:      Pharynx: No posterior oropharyngeal erythema.   Eyes:      Extraocular Movements: Extraocular movements intact.      Conjunctiva/sclera: Conjunctivae normal.      Pupils: Pupils are equal, round, and reactive to light.   Cardiovascular:      Rate and Rhythm: Normal rate and regular rhythm.      Heart sounds: No murmur heard.  Pulmonary:      Effort: Pulmonary effort is normal. No respiratory distress.      Breath sounds: Normal breath sounds. No wheezing.   Abdominal:      General: Bowel sounds are normal. There is no distension.      Palpations: Abdomen is soft.      Tenderness: There is no abdominal tenderness.   Musculoskeletal:      Cervical back: Neck supple.      Right lower leg: No edema.      Left lower leg: No edema.   Lymphadenopathy:      Cervical: No cervical adenopathy.   Skin:     General: Skin is warm and dry.   Neurological:      Mental Status: She is alert and oriented to person, place, and time.      Cranial Nerves: No cranial nerve deficit.      Deep Tendon Reflexes: Reflexes normal.   Psychiatric:         Mood and Affect: Mood normal.         Behavior: Behavior normal.             Diagnoses and all orders for this visit:    1. Well adult exam (Primary)    2. Anxiety    3. Overweight    4. PCOS (polycystic ovarian syndrome)    5. Screening mammogram for breast cancer  -     Mammo Screening " Digital Tomosynthesis Bilateral With CAD    6. Screening, lipid  -     Comprehensive Metabolic Panel  -     TSH    7. Screening for endocrine, metabolic and immunity disorder  -     Lipid Panel    8. Screening for blood disease  -     CBC & Differential    Other orders  -     spironolactone (ALDACTONE) 100 MG tablet; Take 1 tablet by mouth 2 (Two) Times a Day.  Dispense: 180 tablet; Refill: 3        Assessment & Plan  1. Well adult exam.  Discussed routine health maintenance: vaccinations, dental and eye health, balanced diet, regular exercise, and screenings for colorectal cancer, breast cancer, and Pap smears. Addressed mental health. Ordered mammogram. Ordered basic labs.    2. Anxiety.  Improved with Wellbutrin and propranolol. Continue current medications.  Consider adjusting medication regimen if anxiety or depression increases after exam.    3. Overweight.  Continue semaglutide for now; cannot obtain through compound pharmacy. Inform office if exploring additional weight loss options.    4. PCOS  Improved with spironolactone    Follow-up  Follow up in 1 year or sooner if needed.    New Medications Ordered This Visit   Medications    spironolactone (ALDACTONE) 100 MG tablet     Sig: Take 1 tablet by mouth 2 (Two) Times a Day.     Dispense:  180 tablet     Refill:  3       No orders of the defined types were placed in this encounter.      Return in about 1 year (around 5/28/2026) for Annual.      Aviva Stout DO

## 2025-06-20 LAB
ALBUMIN SERPL-MCNC: 4.5 G/DL (ref 3.5–5.2)
ALBUMIN/GLOB SERPL: 1.7 G/DL
ALP SERPL-CCNC: 45 U/L (ref 39–117)
ALT SERPL-CCNC: 6 U/L (ref 1–33)
AST SERPL-CCNC: 11 U/L (ref 1–32)
BASOPHILS # BLD AUTO: 0.05 10*3/MM3 (ref 0–0.2)
BASOPHILS NFR BLD AUTO: 0.9 % (ref 0–1.5)
BILIRUB SERPL-MCNC: 0.5 MG/DL (ref 0–1.2)
BUN SERPL-MCNC: 11 MG/DL (ref 6–20)
BUN/CREAT SERPL: 10.3 (ref 7–25)
CALCIUM SERPL-MCNC: 9.6 MG/DL (ref 8.6–10.5)
CHLORIDE SERPL-SCNC: 103 MMOL/L (ref 98–107)
CHOLEST SERPL-MCNC: 204 MG/DL (ref 0–200)
CO2 SERPL-SCNC: 24.6 MMOL/L (ref 22–29)
CREAT SERPL-MCNC: 1.07 MG/DL (ref 0.57–1)
EGFRCR SERPLBLD CKD-EPI 2021: 63.8 ML/MIN/1.73
EOSINOPHIL # BLD AUTO: 0.08 10*3/MM3 (ref 0–0.4)
EOSINOPHIL NFR BLD AUTO: 1.5 % (ref 0.3–6.2)
ERYTHROCYTE [DISTWIDTH] IN BLOOD BY AUTOMATED COUNT: 12.9 % (ref 12.3–15.4)
GLOBULIN SER CALC-MCNC: 2.7 GM/DL
GLUCOSE SERPL-MCNC: 80 MG/DL (ref 65–99)
HCT VFR BLD AUTO: 42.7 % (ref 34–46.6)
HDLC SERPL-MCNC: 52 MG/DL (ref 40–60)
HGB BLD-MCNC: 14.3 G/DL (ref 12–15.9)
IMM GRANULOCYTES # BLD AUTO: 0 10*3/MM3 (ref 0–0.05)
IMM GRANULOCYTES NFR BLD AUTO: 0 % (ref 0–0.5)
LDLC SERPL CALC-MCNC: 135 MG/DL (ref 0–100)
LYMPHOCYTES # BLD AUTO: 2.01 10*3/MM3 (ref 0.7–3.1)
LYMPHOCYTES NFR BLD AUTO: 37.8 % (ref 19.6–45.3)
MCH RBC QN AUTO: 29.7 PG (ref 26.6–33)
MCHC RBC AUTO-ENTMCNC: 33.5 G/DL (ref 31.5–35.7)
MCV RBC AUTO: 88.8 FL (ref 79–97)
MONOCYTES # BLD AUTO: 0.45 10*3/MM3 (ref 0.1–0.9)
MONOCYTES NFR BLD AUTO: 8.5 % (ref 5–12)
NEUTROPHILS # BLD AUTO: 2.73 10*3/MM3 (ref 1.7–7)
NEUTROPHILS NFR BLD AUTO: 51.3 % (ref 42.7–76)
NRBC BLD AUTO-RTO: 0 /100 WBC (ref 0–0.2)
PLATELET # BLD AUTO: 245 10*3/MM3 (ref 140–450)
POTASSIUM SERPL-SCNC: 4.4 MMOL/L (ref 3.5–5.2)
PROT SERPL-MCNC: 7.2 G/DL (ref 6–8.5)
RBC # BLD AUTO: 4.81 10*6/MM3 (ref 3.77–5.28)
SODIUM SERPL-SCNC: 138 MMOL/L (ref 136–145)
TRIGL SERPL-MCNC: 95 MG/DL (ref 0–150)
TSH SERPL DL<=0.005 MIU/L-ACNC: 1.64 UIU/ML (ref 0.27–4.2)
VLDLC SERPL CALC-MCNC: 17 MG/DL (ref 5–40)
WBC # BLD AUTO: 5.32 10*3/MM3 (ref 3.4–10.8)

## 2025-08-19 ENCOUNTER — HOSPITAL ENCOUNTER (OUTPATIENT)
Dept: MAMMOGRAPHY | Facility: HOSPITAL | Age: 50
Discharge: HOME | End: 2025-08-19
Admitting: FAMILY MEDICINE
Payer: COMMERCIAL

## 2025-08-19 PROCEDURE — 77067 SCR MAMMO BI INCL CAD: CPT

## 2025-08-19 PROCEDURE — 77063 BREAST TOMOSYNTHESIS BI: CPT
